# Patient Record
Sex: MALE | HISPANIC OR LATINO | URBAN - METROPOLITAN AREA
[De-identification: names, ages, dates, MRNs, and addresses within clinical notes are randomized per-mention and may not be internally consistent; named-entity substitution may affect disease eponyms.]

---

## 2020-07-25 ENCOUNTER — TELEPHONE ENCOUNTER (OUTPATIENT)
Dept: URBAN - METROPOLITAN AREA CLINIC 13 | Facility: CLINIC | Age: 60
End: 2020-07-25

## 2020-07-25 RX ORDER — POLYETHYLENE GLYCOL 3350, SODIUM CHLORIDE, SODIUM BICARBONATE AND POTASSIUM CHLORIDE WITH LEMON FLAVOR 420; 11.2; 5.72; 1.48 G/4L; G/4L; G/4L; G/4L
TAKE 1/2 GALLON AT 5:00 PM DAY BEFORE PROCEDURE, TAKE SECOND 1/2 OF GALLON 6 HRS PRIOR TO PROCEDURE POWDER, FOR SOLUTION ORAL
Qty: 1 | Refills: 0 | OUTPATIENT
Start: 2017-09-20 | End: 2017-11-03

## 2020-07-26 ENCOUNTER — TELEPHONE ENCOUNTER (OUTPATIENT)
Dept: URBAN - METROPOLITAN AREA CLINIC 13 | Facility: CLINIC | Age: 60
End: 2020-07-26

## 2020-07-26 RX ORDER — SIMVASTATIN 40 MG/1
TAKE 1 TABLET AT BEDTIME TABLET, FILM COATED ORAL
Refills: 0 | Status: ACTIVE | COMMUNITY
Start: 2017-09-20

## 2020-10-28 ENCOUNTER — TELEPHONE ENCOUNTER (OUTPATIENT)
Dept: URBAN - METROPOLITAN AREA CLINIC 113 | Facility: CLINIC | Age: 60
End: 2020-10-28

## 2022-02-15 PROBLEM — 275978004 COLON CANCER SCREENING: Status: ACTIVE | Noted: 2022-02-15

## 2022-03-07 ENCOUNTER — WEB ENCOUNTER (OUTPATIENT)
Dept: URBAN - METROPOLITAN AREA SURGERY CENTER 25 | Facility: SURGERY CENTER | Age: 62
End: 2022-03-07

## 2022-03-07 ENCOUNTER — CLAIMS CREATED FROM THE CLAIM WINDOW (OUTPATIENT)
Dept: URBAN - METROPOLITAN AREA CLINIC 4 | Facility: CLINIC | Age: 62
End: 2022-03-07
Payer: COMMERCIAL

## 2022-03-07 ENCOUNTER — OFFICE VISIT (OUTPATIENT)
Dept: URBAN - METROPOLITAN AREA SURGERY CENTER 25 | Facility: SURGERY CENTER | Age: 62
End: 2022-03-07
Payer: COMMERCIAL

## 2022-03-07 DIAGNOSIS — D12.0 BENIGN NEOPLASM OF CECUM: ICD-10-CM

## 2022-03-07 DIAGNOSIS — D12.0 ADENOMA OF CECUM: ICD-10-CM

## 2022-03-07 DIAGNOSIS — Z86.010 ADENOMAS PERSONAL HISTORY OF COLONIC POLYPS: ICD-10-CM

## 2022-03-07 DIAGNOSIS — K63.89 GASEOUS DISTENTION OF INTESTINE DETERMINED BY X-RAY: ICD-10-CM

## 2022-03-07 PROCEDURE — 45385 COLONOSCOPY W/LESION REMOVAL: CPT | Performed by: INTERNAL MEDICINE

## 2022-03-07 PROCEDURE — 88305 TISSUE EXAM BY PATHOLOGIST: CPT | Performed by: PATHOLOGY

## 2022-03-07 PROCEDURE — G8907 PT DOC NO EVENTS ON DISCHARG: HCPCS | Performed by: INTERNAL MEDICINE

## 2022-03-07 RX ORDER — SIMVASTATIN 40 MG/1
TAKE 1 TABLET AT BEDTIME TABLET, FILM COATED ORAL
Refills: 0 | Status: ACTIVE | COMMUNITY
Start: 2017-09-20

## 2022-03-09 ENCOUNTER — TELEPHONE ENCOUNTER (OUTPATIENT)
Dept: URBAN - METROPOLITAN AREA CLINIC 113 | Facility: CLINIC | Age: 62
End: 2022-03-09

## 2022-04-19 ENCOUNTER — OFFICE VISIT (OUTPATIENT)
Dept: URBAN - METROPOLITAN AREA CLINIC 113 | Facility: CLINIC | Age: 62
End: 2022-04-19

## 2022-04-19 NOTE — HPI-TODAY'S VISIT:
62-year-old male presents for follow-up after TACE.  A colonoscopy was performed on 3/7/2022.  BB PS score of 9 using Suprep.  Exam was notable for nonbleeding Grade II internal hemorrhoids and removal of three 8 mm polyps in the cecum.  Pathology revealed tubular adenomas and hyperplastic polyps.  Repeat colonoscopy is recommended in 3 years for surveillance. Patient contacted the office on 3/9 complaining of dizziness, nausea and vomiting following colonoscopy.  He was advised to go to the ER or urgent care.

## 2025-03-07 ENCOUNTER — TELEPHONE ENCOUNTER (OUTPATIENT)
Dept: URBAN - METROPOLITAN AREA CLINIC 113 | Facility: CLINIC | Age: 65
End: 2025-03-07

## 2025-06-06 ENCOUNTER — OFFICE VISIT (OUTPATIENT)
Dept: URBAN - METROPOLITAN AREA CLINIC 113 | Facility: CLINIC | Age: 65
End: 2025-06-06
Payer: COMMERCIAL

## 2025-06-06 ENCOUNTER — DASHBOARD ENCOUNTERS (OUTPATIENT)
Age: 65
End: 2025-06-06

## 2025-06-06 DIAGNOSIS — K64.1 GRADE II HEMORRHOIDS: ICD-10-CM

## 2025-06-06 DIAGNOSIS — K21.9 GASTROESOPHAGEAL REFLUX DISEASE, UNSPECIFIED WHETHER ESOPHAGITIS PRESENT: ICD-10-CM

## 2025-06-06 DIAGNOSIS — Z86.0101 H/O ADENOMATOUS POLYP OF COLON: ICD-10-CM

## 2025-06-06 PROBLEM — 235595009: Status: ACTIVE | Noted: 2025-06-06

## 2025-06-06 PROBLEM — 721704005: Status: ACTIVE | Noted: 2025-06-06

## 2025-06-06 PROCEDURE — 99214 OFFICE O/P EST MOD 30 MIN: CPT | Performed by: INTERNAL MEDICINE

## 2025-06-06 RX ORDER — SIMVASTATIN 40 MG/1
TAKE 1 TABLET AT BEDTIME TABLET, FILM COATED ORAL
Refills: 0 | Status: ACTIVE | COMMUNITY
Start: 2017-09-20

## 2025-06-06 RX ORDER — TIMOLOL MALEATE OPHTHALMIC 5 MG/ML
1 DROP INTO AFFECTED EYE SOLUTION/ DROPS OPHTHALMIC ONCE A DAY
Status: ACTIVE | COMMUNITY

## 2025-06-06 NOTE — HPI-ZZZTODAY'S VISIT
65-year-old male referred by Dr. Indy Henning for colonoscopy consultation.  She did have a colonoscopy performed in March 2022.  He was instructed to repeat in 3 years.  He is here today for follow-up and evaluation for scheduling a colonoscopy.   S/P Cardiology evaluation and stress test. Negative. Some increased pressure related to eating. Possible GERD.   Patient is without any abdominal complaints. No dysphagia, heartburn, regurgitation, unintentional weight loss, nausea, vomiting, hematemesis or melena. Bowels are moving regularly without blood per rectum. No complaints of bloating. No jaundice, icterus.

## 2025-08-06 ENCOUNTER — OFFICE VISIT (OUTPATIENT)
Dept: URBAN - METROPOLITAN AREA SURGERY CENTER 25 | Facility: SURGERY CENTER | Age: 65
End: 2025-08-06